# Patient Record
Sex: FEMALE | Race: WHITE | NOT HISPANIC OR LATINO | Employment: FULL TIME | URBAN - METROPOLITAN AREA
[De-identification: names, ages, dates, MRNs, and addresses within clinical notes are randomized per-mention and may not be internally consistent; named-entity substitution may affect disease eponyms.]

---

## 2023-07-24 ENCOUNTER — APPOINTMENT (OUTPATIENT)
Dept: PRIMARY CARE | Facility: CLINIC | Age: 58
End: 2023-07-24
Payer: COMMERCIAL

## 2023-10-16 ENCOUNTER — OFFICE VISIT (OUTPATIENT)
Dept: PRIMARY CARE | Facility: CLINIC | Age: 58
End: 2023-10-16
Payer: COMMERCIAL

## 2023-10-16 VITALS
BODY MASS INDEX: 35.37 KG/M2 | OXYGEN SATURATION: 96 % | HEART RATE: 74 BPM | WEIGHT: 193.4 LBS | DIASTOLIC BLOOD PRESSURE: 73 MMHG | SYSTOLIC BLOOD PRESSURE: 128 MMHG

## 2023-10-16 DIAGNOSIS — Z12.31 ENCOUNTER FOR SCREENING MAMMOGRAM FOR BREAST CANCER: ICD-10-CM

## 2023-10-16 DIAGNOSIS — Z12.11 COLON CANCER SCREENING: ICD-10-CM

## 2023-10-16 DIAGNOSIS — Z12.4 CERVICAL CANCER SCREENING: ICD-10-CM

## 2023-10-16 DIAGNOSIS — E66.01 CLASS 2 SEVERE OBESITY WITH SERIOUS COMORBIDITY AND BODY MASS INDEX (BMI) OF 35.0 TO 35.9 IN ADULT, UNSPECIFIED OBESITY TYPE (MULTI): ICD-10-CM

## 2023-10-16 DIAGNOSIS — Z00.00 ANNUAL PHYSICAL EXAM: Primary | ICD-10-CM

## 2023-10-16 PROCEDURE — 99396 PREV VISIT EST AGE 40-64: CPT | Performed by: FAMILY MEDICINE

## 2023-10-16 PROCEDURE — 88175 CYTOPATH C/V AUTO FLUID REDO: CPT

## 2023-10-16 PROCEDURE — 3008F BODY MASS INDEX DOCD: CPT | Performed by: FAMILY MEDICINE

## 2023-10-16 PROCEDURE — 1036F TOBACCO NON-USER: CPT | Performed by: FAMILY MEDICINE

## 2023-10-16 RX ORDER — ACETAMINOPHEN 500 MG
1 TABLET ORAL DAILY
COMMUNITY

## 2023-10-16 RX ORDER — IBUPROFEN 200 MG
200 TABLET ORAL EVERY 6 HOURS
COMMUNITY

## 2023-10-16 NOTE — PROGRESS NOTES
Subjective   Patient ID: Joan Jean is a 58 y.o. female who presents for Annual Exam.  Annual wellness  Had knee replaced went well  No other interval changes  No angina palpitations or syncope with physical activity  No falls or accidents  Postmenopausal  No change in bowel habits no blood in the stool no regular supplements no new family history          Review of Systems  Constitutional: no chills, no fever and no night sweats.   Eyes: no blurred vision and no eyesight problems.   ENT: no hearing loss, no nasal congestion, no nasal discharge, no hoarseness and no sore throat.   Cardiovascular: no chest pain, no intermittent leg claudication, no lower extremity edema, no palpitations and no syncope.   Respiratory: no cough, no shortness of breath during exertion, no shortness of breath at rest and no wheezing.   Gastrointestinal: no abdominal pain, no blood in stools, no constipation, no diarrhea, no melena, no nausea, no rectal pain and no vomiting.   Genitourinary: no dysuria, no change in urinary frequency, no urinary hesitancy, no feelings of urinary urgency and no vaginal discharge.   Musculoskeletal: no arthralgias,  no back pain and no myalgias.   Integumentary: no new skin lesions and no rashes.   Neurological: no difficulty walking, no headache, no limb weakness, no numbness and no tingling.   Psychiatric: no anxiety, no depression, no anhedonia and no substance use disorders.   Endocrine: no recent weight gain and no recent weight loss.   Hematologic/Lymphatic: no tendency for easy bruising and no swollen glands .    Objective    /73   Pulse 74   Wt 87.7 kg (193 lb 6.4 oz)   SpO2 96%   BMI 35.37 kg/m²    Physical Exam  The patient appeared well nourished and normally developed. Vital signs as documented. Head exam is unremarkable. No scleral icterus or corneal arcus noted.  Pupils are equal round reactive to light extraocular movements are intact no hemorrhages noted on funduscopic exam  mouth mucous membranes are moist no exudates ears canals clear TMs are gray pearly not injected nose no rhinorrhea or epistaxis Neck is without jugular venous distension, thyromegaly, or carotid bruits. Carotid upstrokes are brisk bilaterally. Lungs are clear to auscultation and percussion. Cardiac exam reveals the PMI to be normally sized and situated. Rhythm is regular. First and second heart sounds normal. No murmurs, rubs or gallops. Abdominal exam reveals normal bowel sounds, no masses, no organomegaly and no aortic enlargement. Extremities are nonedematous and both femoral and pedal pulses are normal.  Neurologic exam DTRs are equal bilaterally no focal deficits strength is symmetrical heme lymph no palpable lymph nodes in the neck axilla or groin  Breast no masses discharge or axillary masses or asymmetry external genitalia unremarkable no adnexal tenderness or masses no cervicitis or cervical motion tenderness no uterine enlargement  Assessment/Plan   Problem List Items Addressed This Visit       Annual physical exam - Primary     Continue annual exams         Relevant Orders    Comprehensive Metabolic Panel    Lipid Panel    Hemoglobin A1C    Vitamin D 25-Hydroxy,Total (for eval of Vitamin D levels)    Class 2 severe obesity with serious comorbidity and body mass index (BMI) of 35.0 to 35.9 in adult (CMS/[facility])     Above normal BMI nutrition and physical activity reviewed          Other Visit Diagnoses       Colon cancer screening        Relevant Orders    Colonoscopy Screening    Encounter for screening mammogram for breast cancer        Relevant Orders    BI mammo bilateral screening tomosynthesis    Cervical cancer screening        Relevant Orders    THINPREP PAP TEST (Completed)                 Carine Sprague MD

## 2023-10-27 LAB
CYTOLOGY CMNT CVX/VAG CYTO-IMP: NORMAL
LAB AP HPV GENOTYPE QUESTION: YES
LAB AP HPV HR: NORMAL
LABORATORY COMMENT REPORT: NORMAL
PATH REPORT.TOTAL CANCER: NORMAL

## 2023-10-31 ENCOUNTER — TELEPHONE (OUTPATIENT)
Dept: PRIMARY CARE | Facility: CLINIC | Age: 58
End: 2023-10-31
Payer: COMMERCIAL

## 2023-11-07 PROBLEM — M62.81 MUSCLE WEAKNESS: Status: RESOLVED | Noted: 2018-11-16 | Resolved: 2023-11-07

## 2023-11-07 PROBLEM — R39.89 SUSPECTED UTI: Status: RESOLVED | Noted: 2023-11-07 | Resolved: 2023-11-07

## 2023-11-07 PROBLEM — K44.9 HIATAL HERNIA WITH GERD WITHOUT ESOPHAGITIS: Status: ACTIVE | Noted: 2023-11-07

## 2023-11-07 PROBLEM — E55.9 VITAMIN D DEFICIENCY: Status: ACTIVE | Noted: 2023-11-07

## 2023-11-07 PROBLEM — J30.2 SEASONAL ALLERGIES: Status: ACTIVE | Noted: 2023-11-07

## 2023-11-07 PROBLEM — G89.29 CHRONIC PAIN OF BOTH KNEES: Status: ACTIVE | Noted: 2018-11-16

## 2023-11-07 PROBLEM — G43.909 MIGRAINE: Status: ACTIVE | Noted: 2023-11-07

## 2023-11-07 PROBLEM — M25.561 CHRONIC PAIN OF BOTH KNEES: Status: ACTIVE | Noted: 2018-11-16

## 2023-11-07 PROBLEM — M17.12 ARTHRITIS OF LEFT KNEE: Status: ACTIVE | Noted: 2023-11-07

## 2023-11-07 PROBLEM — R53.83 FATIGUE: Status: RESOLVED | Noted: 2023-11-07 | Resolved: 2023-11-07

## 2023-11-07 PROBLEM — M25.562 CHRONIC PAIN OF BOTH KNEES: Status: ACTIVE | Noted: 2018-11-16

## 2023-11-07 PROBLEM — F41.9 ANXIETY DISORDER: Status: ACTIVE | Noted: 2023-11-07

## 2023-11-07 PROBLEM — K21.9 HIATAL HERNIA WITH GERD WITHOUT ESOPHAGITIS: Status: ACTIVE | Noted: 2023-11-07

## 2023-11-07 PROBLEM — D50.0 BLOOD LOSS ANEMIA: Status: RESOLVED | Noted: 2023-11-07 | Resolved: 2023-11-07

## 2023-11-07 PROBLEM — Z96.659 S/P TOTAL KNEE REPLACEMENT: Status: ACTIVE | Noted: 2023-11-07

## 2023-11-08 PROBLEM — E66.812 CLASS 2 SEVERE OBESITY WITH SERIOUS COMORBIDITY AND BODY MASS INDEX (BMI) OF 35.0 TO 35.9 IN ADULT: Status: ACTIVE | Noted: 2023-11-08

## 2023-11-08 PROBLEM — E66.01 CLASS 2 SEVERE OBESITY WITH SERIOUS COMORBIDITY AND BODY MASS INDEX (BMI) OF 35.0 TO 35.9 IN ADULT (MULTI): Status: ACTIVE | Noted: 2023-11-08

## 2023-11-13 LAB
NON-UH HIE A/G RATIO: 1.3
NON-UH HIE ALB: 3.8 G/DL (ref 3.4–5)
NON-UH HIE ALK PHOS: 84 UNIT/L (ref 45–117)
NON-UH HIE BILIRUBIN, TOTAL: 0.6 MG/DL (ref 0.3–1.2)
NON-UH HIE BUN/CREAT RATIO: 21.7
NON-UH HIE BUN: 13 MG/DL (ref 9–23)
NON-UH HIE CALCIUM: 9.5 MG/DL (ref 8.7–10.4)
NON-UH HIE CALCULATED LDL CHOLESTEROL: 127 MG/DL (ref 60–130)
NON-UH HIE CALCULATED OSMOLALITY: 281 MOSM/KG (ref 275–295)
NON-UH HIE CHLORIDE: 107 MMOL/L (ref 98–107)
NON-UH HIE CHOLESTEROL: 215 MG/DL (ref 100–200)
NON-UH HIE CO2, VENOUS: 30 MMOL/L (ref 20–31)
NON-UH HIE CREATININE: 0.6 MG/DL (ref 0.5–0.8)
NON-UH HIE GFR AA: >60
NON-UH HIE GLOBULIN: 2.9 G/DL
NON-UH HIE GLOMERULAR FILTRATION RATE: >60 ML/MIN/1.73M?
NON-UH HIE GLUCOSE: 89 MG/DL (ref 74–106)
NON-UH HIE GOT: 19 UNIT/L (ref 15–37)
NON-UH HIE GPT: 17 UNIT/L (ref 10–49)
NON-UH HIE HDL CHOLESTEROL: 76 MG/DL (ref 40–60)
NON-UH HIE HGB A1C: 5.5 %
NON-UH HIE K: 4.3 MMOL/L (ref 3.5–5.1)
NON-UH HIE NA: 141 MMOL/L (ref 135–145)
NON-UH HIE TOTAL CHOL/HDL CHOL RATIO: 2.8
NON-UH HIE TOTAL PROTEIN: 6.7 G/DL (ref 5.7–8.2)
NON-UH HIE TRIGLYCERIDES: 58 MG/DL (ref 30–150)
NON-UH HIE VIT D 25: 29 NG/ML

## 2023-11-13 NOTE — TELEPHONE ENCOUNTER
Please call patient and refer her to a gastroenterologist  I use Dr. Catherine at [facility]

## 2023-12-20 ENCOUNTER — TELEPHONE (OUTPATIENT)
Dept: PRIMARY CARE | Facility: CLINIC | Age: 58
End: 2023-12-20
Payer: COMMERCIAL

## 2023-12-23 NOTE — RESULT ENCOUNTER NOTE
Please call patient  Blood work is normal except that her cholesterol is slightly high  I recommend low-cholesterol diet and we can discuss cholesterol-lowering medication  She is in the office  Vitamin D is very slightly low normal is 30 hers is 29 I recommend a daily vitamin D supplement of 1000 or 2000 units a day and we can repeat the level in a few months

## 2023-12-28 ENCOUNTER — TELEPHONE (OUTPATIENT)
Dept: PRIMARY CARE | Facility: CLINIC | Age: 58
End: 2023-12-28
Payer: COMMERCIAL

## 2023-12-28 NOTE — TELEPHONE ENCOUNTER
----- Message from Carine Sprague MD sent at 12/23/2023  2:02 PM EST -----  Please call patient  Blood work is normal except that her cholesterol is slightly high  I recommend low-cholesterol diet and we can discuss cholesterol-lowering medication  She is in the office  Vitamin D is very slightly low normal is 30 hers is 29 I recommend a daily vitamin D supplement of 1000 or 2000 units a day and we can repeat the level in a few months

## 2024-02-06 ENCOUNTER — TELEPHONE (OUTPATIENT)
Dept: PRIMARY CARE | Facility: CLINIC | Age: 59
End: 2024-02-06
Payer: COMMERCIAL

## 2024-02-08 NOTE — TELEPHONE ENCOUNTER
Pt called again to get a letter so she can get out of jury duty because she had surgery on her left knee and it's hard to walk. She is on call for Jury duty is on 3/1/24. Please let pt know when letter is done.

## 2024-02-26 NOTE — RESULT ENCOUNTER NOTE
Please call patient and tell her that her mammogram is negative with no sign of cancer at this time she can repeat in 1 year

## 2024-02-29 ENCOUNTER — TELEPHONE (OUTPATIENT)
Dept: PRIMARY CARE | Facility: CLINIC | Age: 59
End: 2024-02-29
Payer: COMMERCIAL

## 2024-02-29 NOTE — TELEPHONE ENCOUNTER
----- Message from Carine Sprague MD sent at 2/26/2024  1:11 AM EST -----  Please call patient and tell her that her mammogram is negative with no sign of cancer at this time she can repeat in 1 year

## 2025-04-07 ENCOUNTER — APPOINTMENT (OUTPATIENT)
Dept: PRIMARY CARE | Facility: CLINIC | Age: 60
End: 2025-04-07
Payer: COMMERCIAL

## 2025-04-07 VITALS
OXYGEN SATURATION: 95 % | BODY MASS INDEX: 35.33 KG/M2 | HEIGHT: 62 IN | TEMPERATURE: 97.7 F | HEART RATE: 69 BPM | WEIGHT: 192 LBS | DIASTOLIC BLOOD PRESSURE: 80 MMHG | SYSTOLIC BLOOD PRESSURE: 133 MMHG

## 2025-04-07 DIAGNOSIS — Z00.00 ANNUAL PHYSICAL EXAM: Primary | ICD-10-CM

## 2025-04-07 DIAGNOSIS — Z12.31 ENCOUNTER FOR SCREENING MAMMOGRAM FOR BREAST CANCER: ICD-10-CM

## 2025-04-07 DIAGNOSIS — E66.812 CLASS 2 SEVERE OBESITY WITH SERIOUS COMORBIDITY AND BODY MASS INDEX (BMI) OF 35.0 TO 35.9 IN ADULT, UNSPECIFIED OBESITY TYPE: ICD-10-CM

## 2025-04-07 DIAGNOSIS — Z23 ENCOUNTER FOR IMMUNIZATION: ICD-10-CM

## 2025-04-07 DIAGNOSIS — Z12.4 CERVICAL CANCER SCREENING: ICD-10-CM

## 2025-04-07 DIAGNOSIS — E66.01 CLASS 2 SEVERE OBESITY WITH SERIOUS COMORBIDITY AND BODY MASS INDEX (BMI) OF 35.0 TO 35.9 IN ADULT, UNSPECIFIED OBESITY TYPE: ICD-10-CM

## 2025-04-07 PROCEDURE — 3008F BODY MASS INDEX DOCD: CPT | Performed by: FAMILY MEDICINE

## 2025-04-07 PROCEDURE — 99396 PREV VISIT EST AGE 40-64: CPT | Performed by: FAMILY MEDICINE

## 2025-04-07 PROCEDURE — 90471 IMMUNIZATION ADMIN: CPT | Performed by: FAMILY MEDICINE

## 2025-04-07 PROCEDURE — 88175 CYTOPATH C/V AUTO FLUID REDO: CPT

## 2025-04-07 PROCEDURE — 1036F TOBACCO NON-USER: CPT | Performed by: FAMILY MEDICINE

## 2025-04-07 PROCEDURE — 90677 PCV20 VACCINE IM: CPT | Performed by: FAMILY MEDICINE

## 2025-04-07 ASSESSMENT — PATIENT HEALTH QUESTIONNAIRE - PHQ9
SUM OF ALL RESPONSES TO PHQ9 QUESTIONS 1 AND 2: 0
2. FEELING DOWN, DEPRESSED OR HOPELESS: NOT AT ALL
1. LITTLE INTEREST OR PLEASURE IN DOING THINGS: NOT AT ALL

## 2025-04-07 ASSESSMENT — COLUMBIA-SUICIDE SEVERITY RATING SCALE - C-SSRS
2. HAVE YOU ACTUALLY HAD ANY THOUGHTS OF KILLING YOURSELF?: NO
1. IN THE PAST MONTH, HAVE YOU WISHED YOU WERE DEAD OR WISHED YOU COULD GO TO SLEEP AND NOT WAKE UP?: NO
6. HAVE YOU EVER DONE ANYTHING, STARTED TO DO ANYTHING, OR PREPARED TO DO ANYTHING TO END YOUR LIFE?: NO

## 2025-04-07 NOTE — PROGRESS NOTES
"Subjective   Patient ID: Joan Jean is a 59 y.o. female who presents for Annual Exam (Pt is here for annual exam with a pap) and Gynecologic Exam.  Very pleasant 59-year-old for annual physical exam  No hospital or emergency room visits no interval changes  Patient is not a smoker  Postmenopausal no vaginal bleeding  Denies angina palpitations or syncope no fever chills or night sweats no change in bowel or bladder habits no blood in the urine or stool no new concerns        Review of Systems  Constitutional: no chills, no fever and no night sweats.   Eyes: no blurred vision and no eyesight problems.   ENT: no hearing loss, no nasal congestion, no nasal discharge, no hoarseness and no sore throat.   Cardiovascular: no chest pain, no intermittent leg claudication, no lower extremity edema, no palpitations and no syncope.   Respiratory: no cough, no shortness of breath during exertion, no shortness of breath at rest and no wheezing.   Gastrointestinal: no abdominal pain, no blood in stools, no constipation, no diarrhea, no melena, no nausea, no rectal pain and no vomiting.   Genitourinary: no dysuria, no change in urinary frequency, no urinary hesitancy, no feelings of urinary urgency and no vaginal discharge.   Musculoskeletal: no arthralgias,  no back pain and no myalgias.   Integumentary: no new skin lesions and no rashes.   Neurological: no difficulty walking, no headache, no limb weakness, no numbness and no tingling.   Psychiatric: no anxiety, no depression, no anhedonia and no substance use disorders.   Endocrine: no recent weight gain and no recent weight loss.   Hematologic/Lymphatic: no tendency for easy bruising and no swollen glands .    Objective    /80   Pulse 69   Temp 36.5 °C (97.7 °F)   Ht 1.575 m (5' 2\")   Wt 87.1 kg (192 lb)   SpO2 95%   BMI 35.12 kg/m²    Physical Exam  The patient appeared well nourished and normally developed. Vital signs as documented. Head exam is " unremarkable. No scleral icterus or corneal arcus noted.  Pupils are equal round reactive to light extraocular movements are intact no hemorrhages noted on funduscopic exam mouth mucous membranes are moist no exudates ears canals clear TMs are gray pearly not injected nose no rhinorrhea or epistaxis Neck is without jugular venous distension, thyromegaly, or carotid bruits. Carotid upstrokes are brisk bilaterally. Lungs are clear to auscultation and percussion. Cardiac exam reveals the PMI to be normally sized and situated. Rhythm is regular. First and second heart sounds normal. No murmurs, rubs or gallops. Abdominal exam reveals normal bowel sounds, no masses, no organomegaly and no aortic enlargement. Extremities are nonedematous and both femoral and pedal pulses are normal.  Neurologic exam DTRs are equal bilaterally no focal deficits strength is symmetrical heme lymph no palpable lymph nodes in the neck axilla or groin breast no masses discharge axillary masses or asymmetry external genitalia unremarkable no adnexal tenderness or masses no cervicitis or cervical motion tenderness no uterine enlargement    Assessment/Plan   Problem List Items Addressed This Visit       Annual physical exam - Primary    Routine annual medical exam  Pap today  Continue annual exams         Relevant Orders    Comprehensive metabolic panel    Lipid panel    Hemoglobin A1C    Hepatitis C antibody    CBC and Auto Differential    TSH    Vitamin D 25-Hydroxy,Total (for eval of Vitamin D levels)    Class 2 severe obesity with serious comorbidity and body mass index (BMI) of 35.0 to 35.9 in adult    Above normal BMI nutrition and physical activity reviewed  Recommend weight watchers or Noom          Other Visit Diagnoses         Cervical cancer screening        Relevant Orders    THINPREP PAP TEST      Encounter for screening mammogram for breast cancer        Relevant Orders    BI mammo bilateral screening tomosynthesis      Encounter for  immunization        Relevant Orders    Pneumococcal conjugate vaccine, 20-valent (PREVNAR 20) (Completed)                 Carine Sprague MD

## 2025-04-11 LAB
NON-UH HIE A/G RATIO: 1
NON-UH HIE ALB: 3.7 G/DL (ref 3.4–5)
NON-UH HIE ALK PHOS: 76 UNIT/L (ref 45–117)
NON-UH HIE BASO COUNT: 0.04 X1000 (ref 0–0.2)
NON-UH HIE BASOS %: 0.7 %
NON-UH HIE BILIRUBIN, TOTAL: 0.4 MG/DL (ref 0.3–1.2)
NON-UH HIE BUN/CREAT RATIO: 21.4
NON-UH HIE BUN: 15 MG/DL (ref 9–23)
NON-UH HIE CALCIUM: 9.5 MG/DL (ref 8.7–10.4)
NON-UH HIE CALCULATED LDL CHOLESTEROL: 116 MG/DL (ref 60–130)
NON-UH HIE CALCULATED OSMOLALITY: 278 MOSM/KG (ref 275–295)
NON-UH HIE CHLORIDE: 104 MMOL/L (ref 98–107)
NON-UH HIE CHOLESTEROL: 197 MG/DL (ref 100–200)
NON-UH HIE CO2, VENOUS: 29 MMOL/L (ref 20–31)
NON-UH HIE CREATININE: 0.7 MG/DL (ref 0.5–0.8)
NON-UH HIE DIFF?: NORMAL
NON-UH HIE EOS COUNT: 0.2 X1000 (ref 0–0.5)
NON-UH HIE EOSIN %: 3.8 %
NON-UH HIE GFR AA: >60
NON-UH HIE GLOBULIN: 3.8 G/DL
NON-UH HIE GLOMERULAR FILTRATION RATE: >60 ML/MIN/1.73M?
NON-UH HIE GLUCOSE: 91 MG/DL (ref 74–106)
NON-UH HIE GOT: 22 UNIT/L (ref 15–37)
NON-UH HIE GPT: 17 UNIT/L (ref 10–49)
NON-UH HIE HCT: 40.6 % (ref 36–46)
NON-UH HIE HDL CHOLESTEROL: 73 MG/DL (ref 40–60)
NON-UH HIE HEPATITIS C ANTIBODY: NORMAL
NON-UH HIE HGB A1C: 5.5 %
NON-UH HIE HGB: 13.3 G/DL (ref 12–16)
NON-UH HIE INSTR WBC: 5.4
NON-UH HIE K: 4 MMOL/L (ref 3.5–5.1)
NON-UH HIE LYMPH %: 30 %
NON-UH HIE LYMPH COUNT: 1.62 X1000 (ref 1.2–4.8)
NON-UH HIE MCH: 28.3 PG (ref 27–34)
NON-UH HIE MCHC: 32.8 G/DL (ref 32–37)
NON-UH HIE MCV: 86.4 FL (ref 80–100)
NON-UH HIE MONO %: 10.3 %
NON-UH HIE MONO COUNT: 0.56 X1000 (ref 0.1–1)
NON-UH HIE MPV: 9 FL (ref 7.4–10.4)
NON-UH HIE NA: 139 MMOL/L (ref 135–145)
NON-UH HIE NEUTROPHIL %: 55.2 %
NON-UH HIE NEUTROPHIL COUNT (ANC): 2.98 X1000 (ref 1.4–8.8)
NON-UH HIE NUCLEATED RBC: 0 /100WBC
NON-UH HIE PLATELET: 250 X10 (ref 150–450)
NON-UH HIE RBC: 4.71 X10 (ref 4.2–5.4)
NON-UH HIE RDW: 14.5 % (ref 11.5–14.5)
NON-UH HIE TOTAL CHOL/HDL CHOL RATIO: 2.7
NON-UH HIE TOTAL PROTEIN: 7.5 G/DL (ref 5.7–8.2)
NON-UH HIE TRIGLYCERIDES: 40 MG/DL (ref 30–150)
NON-UH HIE TSH: 2.9 UIU/ML (ref 0.55–4.78)
NON-UH HIE VIT D 25: 19 NG/ML
NON-UH HIE WBC: 5.4 X10 (ref 4.5–11)

## 2025-04-16 ENCOUNTER — TELEPHONE (OUTPATIENT)
Dept: PRIMARY CARE | Facility: CLINIC | Age: 60
End: 2025-04-16
Payer: COMMERCIAL

## 2025-04-16 NOTE — TELEPHONE ENCOUNTER
----- Message from Carine Sprague sent at 4/16/2025 12:56 AM EDT -----  Please call patient tell her her cholesterol levels are good her thyroid is normal her liver and kidney function is normal and her electrolytes are normal  Vitamin D is slightly low I would like her to take her 1000 units once a day and repeat the level in 3 months

## 2025-04-16 NOTE — RESULT ENCOUNTER NOTE
Please call patient tell her her cholesterol levels are good her thyroid is normal her liver and kidney function is normal and her electrolytes are normal  Vitamin D is slightly low I would like her to take her 1000 units once a day and repeat the level in 3 months

## 2025-04-20 PROBLEM — R13.10 DYSPHAGIA: Status: ACTIVE | Noted: 2025-04-20

## 2025-04-20 PROBLEM — M19.90 ARTHRITIS: Status: RESOLVED | Noted: 2025-04-20 | Resolved: 2025-04-20

## 2025-04-20 PROBLEM — N39.0 ACUTE UTI (URINARY TRACT INFECTION): Status: RESOLVED | Noted: 2025-02-09 | Resolved: 2025-04-20

## 2025-04-20 PROBLEM — R35.0 URINARY FREQUENCY: Status: ACTIVE | Noted: 2025-02-09

## 2025-04-23 ENCOUNTER — TELEPHONE (OUTPATIENT)
Dept: PRIMARY CARE | Facility: CLINIC | Age: 60
End: 2025-04-23
Payer: COMMERCIAL

## 2025-04-23 NOTE — TELEPHONE ENCOUNTER
----- Message from Carine Sprague sent at 4/21/2025 11:22 PM EDT -----  Please call patient and tell her that her mammogram is negative with no sign of cancer at this time she can repeat in 1 year  ----- Message -----  From: Filippo Irby - Imaging Results In  Sent: 4/21/2025   6:25 PM EDT  To: Carine Sprague MD

## 2025-04-23 NOTE — TELEPHONE ENCOUNTER
----- Message from Carine Sprague sent at 4/22/2025 11:16 PM EDT -----  Please call Charity and tell her that her Pap is normal with no sign of cancer at this time and it can be repeated in 3 years  ----- Message -----  From: Lab, Background User  Sent: 4/22/2025   8:54 AM EDT  To: Carine Sprague MD

## 2025-04-23 NOTE — RESULT ENCOUNTER NOTE
Please call Charity and tell her that her Pap is normal with no sign of cancer at this time and it can be repeated in 3 years

## 2025-05-12 ENCOUNTER — OFFICE VISIT (OUTPATIENT)
Dept: PRIMARY CARE | Facility: CLINIC | Age: 60
End: 2025-05-12
Payer: COMMERCIAL

## 2025-05-12 VITALS
OXYGEN SATURATION: 98 % | TEMPERATURE: 97.5 F | HEART RATE: 77 BPM | BODY MASS INDEX: 35.34 KG/M2 | DIASTOLIC BLOOD PRESSURE: 77 MMHG | SYSTOLIC BLOOD PRESSURE: 123 MMHG | WEIGHT: 193.2 LBS

## 2025-05-12 DIAGNOSIS — R39.9 UTI SYMPTOMS: Primary | ICD-10-CM

## 2025-05-12 LAB
POC APPEARANCE, URINE: ABNORMAL
POC BILIRUBIN, URINE: NEGATIVE
POC BLOOD, URINE: ABNORMAL
POC COLOR, URINE: YELLOW
POC GLUCOSE, URINE: NEGATIVE MG/DL
POC KETONES, URINE: ABNORMAL MG/DL
POC LEUKOCYTES, URINE: ABNORMAL
POC NITRITE,URINE: NEGATIVE
POC PH, URINE: 5.5 PH
POC PROTEIN, URINE: NEGATIVE MG/DL
POC SPECIFIC GRAVITY, URINE: >=1.03
POC UROBILINOGEN, URINE: 0.2 EU/DL

## 2025-05-12 PROCEDURE — 81003 URINALYSIS AUTO W/O SCOPE: CPT | Performed by: FAMILY MEDICINE

## 2025-05-12 PROCEDURE — 99213 OFFICE O/P EST LOW 20 MIN: CPT | Performed by: FAMILY MEDICINE

## 2025-05-12 PROCEDURE — 1036F TOBACCO NON-USER: CPT | Performed by: FAMILY MEDICINE

## 2025-05-12 RX ORDER — NITROFURANTOIN 25; 75 MG/1; MG/1
100 CAPSULE ORAL 2 TIMES DAILY
Qty: 20 CAPSULE | Refills: 0 | Status: SHIPPED | OUTPATIENT
Start: 2025-05-12

## 2025-05-12 RX ORDER — PHENAZOPYRIDINE HYDROCHLORIDE 200 MG/1
200 TABLET, FILM COATED ORAL 2 TIMES DAILY
Qty: 6 TABLET | Refills: 0 | Status: SHIPPED | OUTPATIENT
Start: 2025-05-12

## 2025-05-12 ASSESSMENT — PATIENT HEALTH QUESTIONNAIRE - PHQ9
SUM OF ALL RESPONSES TO PHQ9 QUESTIONS 1 AND 2: 0
1. LITTLE INTEREST OR PLEASURE IN DOING THINGS: NOT AT ALL
2. FEELING DOWN, DEPRESSED OR HOPELESS: NOT AT ALL

## 2025-05-12 NOTE — PROGRESS NOTES
Subjective   Patient ID: Joan Jean is a 59 y.o. female who presents for UTI (Possible UTI - pressure starting yesterday denies any other symptoms).  Very pleasant 59-year-old UTI symptoms  Started 24 to 48 hours ago  Urinary frequency dysuria burning with urination no fever or chills or systemic symptoms  No hematuria no nausea vomiting or flulike symptoms        Review of Systems  Constitutional: no chills, no fever and no night sweats.   Eyes: no blurred vision and no eyesight problems.   ENT: no hearing loss, no nasal congestion, no nasal discharge, no hoarseness and no sore throat.   Cardiovascular: no chest pain, no intermittent leg claudication, no lower extremity edema, no palpitations and no syncope.   Respiratory: no cough, no shortness of breath during exertion, no shortness of breath at rest and no wheezing.   Gastrointestinal: no abdominal pain, no blood in stools, no constipation, no diarrhea, no melena, no nausea, no rectal pain and no vomiting.   Genitourinary: no dysuria, no change in urinary frequency, no urinary hesitancy, no feelings of urinary urgency and no vaginal discharge.   Musculoskeletal: no arthralgias,  no back pain and no myalgias.   Integumentary: no new skin lesions and no rashes.   Neurological: no difficulty walking, no headache, no limb weakness, no numbness and no tingling.   Psychiatric: no anxiety, no depression, no anhedonia and no substance use disorders.   Endocrine: no recent weight gain and no recent weight loss.   Hematologic/Lymphatic: no tendency for easy bruising and no swollen glands .    Objective    /77   Pulse 77   Temp 36.4 °C (97.5 °F)   Wt 87.6 kg (193 lb 3.2 oz)   SpO2 98%   BMI 35.34 kg/m²    Physical Exam  The patient appeared well nourished and normally developed. Vital signs as documented. Head exam is unremarkable. No scleral icterus or corneal arcus noted.  Pupils are equal round reactive to light extraocular movements are intact no  hemorrhages noted on funduscopic exam mouth mucous membranes are moist no exudates ears canals clear TMs are gray pearly not injected nose no rhinorrhea or epistaxis Neck is without jugular venous distension, thyromegaly, or carotid bruits. Carotid upstrokes are brisk bilaterally. Lungs are clear to auscultation and percussion. Cardiac exam reveals the PMI to be normally sized and situated. Rhythm is regular. First and second heart sounds normal. No murmurs, rubs or gallops. Abdominal exam reveals normal bowel sounds, no masses, no organomegaly and no aortic enlargement. Extremities are nonedematous and both femoral and pedal pulses are normal.  Neurologic exam DTRs are equal bilaterally no focal deficits strength is symmetrical heme lymph no palpable lymph nodes in the neck axilla or groin back no CVA tenderness abdomen soft nontender nondistended no suprapubic tenderness    Assessment/Plan   Problem List Items Addressed This Visit       UTI symptoms - Primary    For urinary tract infection  Send urine for culture treat with antibiotic  Follow-up if symptoms do not improve  Further plan pending results         Relevant Medications    nitrofurantoin, macrocrystal-monohydrate, (Macrobid) 100 mg capsule    phenazopyridine (Pyridium) 200 mg tablet    Other Relevant Orders    POCT UA Automated manually resulted (Completed)    Urine Culture (Completed)            Carine Sprague MD

## 2025-05-14 LAB — BACTERIA UR CULT: NORMAL

## 2025-05-16 NOTE — RESULT ENCOUNTER NOTE
Please call Joan  Her urine test shows she has very few superficial bacterial  If she is feeling better on the antibiotic she can finish it

## 2025-05-18 PROBLEM — R39.9 UTI SYMPTOMS: Status: ACTIVE | Noted: 2025-05-18

## 2025-05-19 NOTE — ASSESSMENT & PLAN NOTE
For urinary tract infection  Send urine for culture treat with antibiotic  Follow-up if symptoms do not improve  Further plan pending results